# Patient Record
(demographics unavailable — no encounter records)

---

## 2025-05-28 NOTE — HISTORY OF PRESENT ILLNESS
[FreeTextEntry1] : 65M w/ PMH of HTN, HLD, former smoker here for CP eval   EKG NSR no significant STT changes  ECHO normal EF mild valvular heart disease  Exercise ST- reached 7 METS, 1 mm ST depression w/ peak exercise  [facility] 5/2025 nonobstructive CAD   Assessment and Plan 1. HTN   2. HLD  3. Nonobstructive CAD   -cont losartan 50 mg qd  -recommend to add crestor 5 mg qhs  -recommend to add asa 81 mg qd  -f/u in 6 months   Bradley Griffin MD [facility] Inter[facility] Attending [facility] Tel: [phone] Mobile: [phone] - Telly Go: [email or web address]